# Patient Record
(demographics unavailable — no encounter records)

---

## 2024-11-05 NOTE — ASSESSMENT
[FreeTextEntry1] : Very nice 49 yo gentleman comes for evaluation of asthma Currently he is asymptomatic Has seasonal allergies, mostly in the spring and summer Itchy eyes, occasional wheezing. Never went to [facility] or hospital Nonsmoker Has used albuterol in 1/2 a year No current distress or wheezing , no dyspnea  HT on amlodipine and losartan No med allergies, no surgery Dog at home Dr Pace at [facility] in [place] is primary [place],  4 children, Negative family history parents alive Works as a  with chemicals  Imp 49 yo man with environmental and seasonal allergies Currently stable Cannot rule out asthma despite normal spirometry, R/o specific allergens--get blood work Spirometry does not suggest obstruction at this time Recommend CXR , PRN albuterol in winter Return in six months for interim evaluation

## 2024-11-05 NOTE — CONSULT LETTER
[Dear  ___] : Dear  [unfilled], [FreeTextEntry1] : I had the pleasure of evaluating your patient, ARIS MOY , in the office today.  Please review my consultation and evaluation report that follows below.  Please do not hesitate to call me if further information is necessary or if you wish to discuss ongoing care or diagnostic work-up.    I very much appreciate your referral and it is a privilege to be able to provide care for your patient.  Sincerely,   Daniel Stevenson MD, MHCM, FACP, OC-C Pulmonary Medicine  of Medicine Marky chaparro [facility] at [facility] [email or web address] in [address] [place]  [address]    Fax   [facility] at [address]  [phone]

## 2024-11-05 NOTE — PROCEDURE
[FreeTextEntry1] : Jose FVC 3.24  74% FEV1 2.75 79% FEV1% 85% midexpir flow 103%  No suggestion of obstruction

## 2024-11-05 NOTE — HISTORY OF PRESENT ILLNESS
[TextBox_4] : Very nice 47 yo gentleman comes for evaluation of asthma Currently he is asymptomatic Has seasonal allergies, mostly in the spring and summer Itchy eyes, occasional wheezing. Never went to [facility] or hospital Nonsmoker Has used albuterol in 1/2 a year No current distress or wheezing , no dyspnea  HT on amlodipine and losartan No med allergies, no surgery Dog at home Dr Pace at [facility] in [place] is primary [place],  4 children, Negative family history parents alive Works as a  with chemicals